# Patient Record
Sex: FEMALE | Race: WHITE | Employment: PART TIME | URBAN - METROPOLITAN AREA
[De-identification: names, ages, dates, MRNs, and addresses within clinical notes are randomized per-mention and may not be internally consistent; named-entity substitution may affect disease eponyms.]

---

## 2017-01-23 ENCOUNTER — ED HISTORICAL/CONVERTED ENCOUNTER (OUTPATIENT)
Dept: OTHER | Age: 42
End: 2017-01-23

## 2018-06-13 ENCOUNTER — ED HISTORICAL/CONVERTED ENCOUNTER (OUTPATIENT)
Dept: OTHER | Age: 43
End: 2018-06-13

## 2018-09-08 ENCOUNTER — IP HISTORICAL/CONVERTED ENCOUNTER (OUTPATIENT)
Dept: OTHER | Age: 43
End: 2018-09-08

## 2018-12-03 ENCOUNTER — ED HISTORICAL/CONVERTED ENCOUNTER (OUTPATIENT)
Dept: OTHER | Age: 43
End: 2018-12-03

## 2019-05-11 ENCOUNTER — ED HISTORICAL/CONVERTED ENCOUNTER (OUTPATIENT)
Dept: OTHER | Age: 44
End: 2019-05-11

## 2019-06-20 ENCOUNTER — HOSPITAL ENCOUNTER (OUTPATIENT)
Dept: MRI IMAGING | Age: 44
Discharge: HOME | End: 2019-06-20
Attending: ORTHOPAEDIC SURGERY

## 2019-06-20 DIAGNOSIS — M54.12 BRACHIAL NEURITIS: ICD-10-CM

## 2019-08-15 ENCOUNTER — OP HISTORICAL/CONVERTED ENCOUNTER (OUTPATIENT)
Dept: OTHER | Age: 44
End: 2019-08-15

## 2019-09-18 ENCOUNTER — OP HISTORICAL/CONVERTED ENCOUNTER (OUTPATIENT)
Dept: OTHER | Age: 44
End: 2019-09-18

## 2020-02-25 ENCOUNTER — ED HISTORICAL/CONVERTED ENCOUNTER (OUTPATIENT)
Dept: OTHER | Age: 45
End: 2020-02-25

## 2020-09-10 RX ORDER — METFORMIN HYDROCHLORIDE 500 MG/1
TABLET ORAL
Qty: 90 TAB | Refills: 4 | Status: SHIPPED | OUTPATIENT
Start: 2020-09-10 | End: 2021-04-19 | Stop reason: ALTCHOICE

## 2020-09-16 VITALS
HEIGHT: 66 IN | BODY MASS INDEX: 32.27 KG/M2 | HEART RATE: 76 BPM | WEIGHT: 200.8 LBS | SYSTOLIC BLOOD PRESSURE: 123 MMHG | TEMPERATURE: 97.5 F | DIASTOLIC BLOOD PRESSURE: 86 MMHG | OXYGEN SATURATION: 98 %

## 2020-09-16 PROBLEM — T78.40XA ALLERGY: Status: ACTIVE | Noted: 2020-09-16

## 2020-09-16 PROBLEM — I10 ESSENTIAL HYPERTENSION: Status: ACTIVE | Noted: 2020-09-16

## 2020-09-16 PROBLEM — E04.1 THYROID NODULE: Status: ACTIVE | Noted: 2020-09-16

## 2020-09-16 PROBLEM — E61.1 IRON DEFICIENCY: Status: ACTIVE | Noted: 2020-09-16

## 2020-09-16 PROBLEM — M19.90 ARTHRITIS: Status: ACTIVE | Noted: 2020-09-16

## 2020-09-16 PROBLEM — E04.2 MULTINODULAR GOITER: Status: ACTIVE | Noted: 2020-09-16

## 2020-09-16 PROBLEM — E78.00 HYPERCHOLESTEROLEMIA: Status: ACTIVE | Noted: 2020-09-16

## 2020-09-16 PROBLEM — E78.2 MIXED HYPERLIPIDEMIA: Status: ACTIVE | Noted: 2020-09-16

## 2020-09-16 RX ORDER — GLIPIZIDE 5 MG/1
TABLET ORAL 2 TIMES DAILY
COMMUNITY
End: 2021-02-09

## 2020-09-16 RX ORDER — HYDROCODONE BITARTRATE AND ACETAMINOPHEN 5; 325 MG/1; MG/1
TABLET ORAL
COMMUNITY
End: 2021-04-19 | Stop reason: ALTCHOICE

## 2020-09-16 RX ORDER — FLUCONAZOLE 150 MG/1
150 TABLET ORAL DAILY
COMMUNITY
End: 2021-04-19 | Stop reason: ALTCHOICE

## 2020-09-16 RX ORDER — PROMETHAZINE HYDROCHLORIDE 6.25 MG/5ML
6.25 SYRUP ORAL
COMMUNITY

## 2020-09-16 RX ORDER — POLYETHYLENE GLYCOL 3350, SODIUM CHLORIDE, SODIUM BICARBONATE, POTASSIUM CHLORIDE 420; 11.2; 5.72; 1.48 G/4L; G/4L; G/4L; G/4L
4000 POWDER, FOR SOLUTION ORAL
COMMUNITY

## 2020-09-16 RX ORDER — LACTULOSE 10 G/15ML
SOLUTION ORAL; RECTAL 3 TIMES DAILY
COMMUNITY

## 2020-09-16 RX ORDER — AMOXICILLIN AND CLAVULANATE POTASSIUM 875; 125 MG/1; MG/1
TABLET, FILM COATED ORAL EVERY 12 HOURS
COMMUNITY
End: 2021-04-19 | Stop reason: ALTCHOICE

## 2020-09-16 RX ORDER — GABAPENTIN 300 MG/1
300 CAPSULE ORAL 3 TIMES DAILY
COMMUNITY

## 2020-09-16 RX ORDER — DICLOFENAC SODIUM 75 MG/1
TABLET, DELAYED RELEASE ORAL
COMMUNITY
End: 2021-04-19 | Stop reason: ALTCHOICE

## 2020-09-16 RX ORDER — AZITHROMYCIN 250 MG/1
250 TABLET, FILM COATED ORAL DAILY
COMMUNITY
End: 2021-04-19 | Stop reason: ALTCHOICE

## 2020-09-16 RX ORDER — ACYCLOVIR 800 MG/1
800 TABLET ORAL 2 TIMES DAILY
COMMUNITY
End: 2021-04-19 | Stop reason: ALTCHOICE

## 2020-09-16 RX ORDER — ALBUTEROL SULFATE 90 UG/1
AEROSOL, METERED RESPIRATORY (INHALATION)
COMMUNITY

## 2020-09-16 RX ORDER — DICYCLOMINE HYDROCHLORIDE 10 MG/1
10 CAPSULE ORAL
COMMUNITY

## 2020-09-16 RX ORDER — METOPROLOL SUCCINATE 25 MG/1
TABLET, EXTENDED RELEASE ORAL DAILY
COMMUNITY

## 2020-09-16 RX ORDER — LIDOCAINE HYDROCHLORIDE 20 MG/ML
15 SOLUTION OROPHARYNGEAL AS NEEDED
COMMUNITY

## 2020-09-16 RX ORDER — CIPROFLOXACIN 500 MG/1
TABLET ORAL 2 TIMES DAILY
COMMUNITY
End: 2021-04-19 | Stop reason: ALTCHOICE

## 2020-09-16 RX ORDER — TRAMADOL HYDROCHLORIDE 50 MG/1
50 TABLET ORAL
COMMUNITY

## 2020-09-16 RX ORDER — AMOXICILLIN 500 MG/1
TABLET, FILM COATED ORAL
COMMUNITY
End: 2021-04-19 | Stop reason: ALTCHOICE

## 2020-09-16 RX ORDER — CEPHALEXIN 500 MG/1
500 CAPSULE ORAL 4 TIMES DAILY
COMMUNITY
End: 2021-04-19 | Stop reason: ALTCHOICE

## 2020-09-16 RX ORDER — SULFAMETHOXAZOLE AND TRIMETHOPRIM 800; 160 MG/1; MG/1
1 TABLET ORAL 2 TIMES DAILY
COMMUNITY
End: 2021-04-19 | Stop reason: ALTCHOICE

## 2020-09-16 RX ORDER — OMEPRAZOLE 40 MG/1
40 CAPSULE, DELAYED RELEASE ORAL DAILY
COMMUNITY
End: 2021-04-19 | Stop reason: ALTCHOICE

## 2020-09-16 RX ORDER — SIMVASTATIN 20 MG/1
TABLET, FILM COATED ORAL
COMMUNITY

## 2020-09-16 RX ORDER — PREDNISONE 20 MG/1
TABLET ORAL
COMMUNITY
End: 2021-04-19 | Stop reason: ALTCHOICE

## 2020-09-16 RX ORDER — LISINOPRIL 5 MG/1
TABLET ORAL DAILY
COMMUNITY

## 2020-09-16 RX ORDER — LISINOPRIL AND HYDROCHLOROTHIAZIDE 12.5; 2 MG/1; MG/1
TABLET ORAL DAILY
COMMUNITY

## 2020-09-16 RX ORDER — AMLODIPINE BESYLATE 5 MG/1
5 TABLET ORAL DAILY
COMMUNITY

## 2020-09-16 RX ORDER — OXYCODONE AND ACETAMINOPHEN 5; 325 MG/1; MG/1
TABLET ORAL
COMMUNITY
End: 2021-04-19 | Stop reason: ALTCHOICE

## 2020-09-16 RX ORDER — LORAZEPAM 1 MG/1
TABLET ORAL
COMMUNITY

## 2020-10-05 ENCOUNTER — TELEPHONE (OUTPATIENT)
Dept: ENDOCRINOLOGY | Age: 45
End: 2020-10-05

## 2020-10-05 DIAGNOSIS — E11.65 TYPE 2 DIABETES MELLITUS WITH HYPERGLYCEMIA, WITHOUT LONG-TERM CURRENT USE OF INSULIN (HCC): Primary | ICD-10-CM

## 2020-10-05 NOTE — TELEPHONE ENCOUNTER
Patient is calling explaining since you took her off of some of her diabetic medications her sugar level has gotten worse. She is trying to get it to go down and it will only if she doubles up on her metformin. Can you please give her a call? Also informed patient to contact her PCP.  Her appt with you is not until November 3 @ 4:15PM.

## 2020-10-06 NOTE — TELEPHONE ENCOUNTER
Please get patient's glucose readings for the past 7 days and send it to me. Also please inform patient that she should not be taking > 4 tabs of metformin 500 mg (total 2000 mg per day) as that is the max dose.

## 2020-10-08 RX ORDER — METFORMIN HYDROCHLORIDE 500 MG/1
TABLET ORAL
Qty: 120 TAB | Refills: 3 | Status: SHIPPED | OUTPATIENT
Start: 2020-10-08 | End: 2021-03-29

## 2020-10-08 RX ORDER — GLIPIZIDE 5 MG/1
5 TABLET ORAL 2 TIMES DAILY
Qty: 60 TAB | Refills: 3 | Status: SHIPPED | OUTPATIENT
Start: 2020-10-08 | End: 2020-11-07

## 2020-10-08 NOTE — TELEPHONE ENCOUNTER
Reviewed glucose log. We need to increase metformin to 500 mg 2 tabs twice a day everyday and I am going to restart glipizide 5 mg twice a day before meals. Need to work on diabetic diet: avoid sugary beverages, fruit juices (even sugar free), cut down on bread, rice, pasta. Check glucose once a day everyday, at different times of day and bring log to next visit. I am sending refill on meds.

## 2020-10-08 NOTE — TELEPHONE ENCOUNTER
Patient said that she has taken 2 in the evening and if she feel like she need it then it will be 4 tabs total for the day. She said that she has an appt with Dr. Erickson Pass Monday morning to get something to go with her Metformin. I will be uploading her log soon.

## 2021-03-11 ENCOUNTER — TELEPHONE (OUTPATIENT)
Dept: ENDOCRINOLOGY | Age: 46
End: 2021-03-11

## 2021-03-11 RX ORDER — GLIPIZIDE 5 MG/1
TABLET ORAL
Qty: 60 TAB | Refills: 0 | Status: SHIPPED | OUTPATIENT
Start: 2021-03-11 | End: 2021-07-19 | Stop reason: SDUPTHER

## 2021-03-11 NOTE — TELEPHONE ENCOUNTER
Patient is overdue for a follow-up appointment. I sent 1 month refill on her medication. She will not be given further refills without first seeing her in the office. Please make appointment next available 15 minutes for diabetes.

## 2021-03-29 ENCOUNTER — TELEPHONE (OUTPATIENT)
Dept: ENDOCRINOLOGY | Age: 46
End: 2021-03-29

## 2021-04-15 RX ORDER — BLOOD SUGAR DIAGNOSTIC
STRIP MISCELLANEOUS
Qty: 100 STRIP | Refills: 1 | Status: SHIPPED | OUTPATIENT
Start: 2021-04-15

## 2021-04-19 ENCOUNTER — OFFICE VISIT (OUTPATIENT)
Dept: ENDOCRINOLOGY | Age: 46
End: 2021-04-19
Payer: COMMERCIAL

## 2021-04-19 VITALS
HEIGHT: 66 IN | HEART RATE: 76 BPM | WEIGHT: 201.9 LBS | OXYGEN SATURATION: 98 % | TEMPERATURE: 97.9 F | SYSTOLIC BLOOD PRESSURE: 115 MMHG | DIASTOLIC BLOOD PRESSURE: 97 MMHG | BODY MASS INDEX: 32.45 KG/M2

## 2021-04-19 DIAGNOSIS — E04.2 MULTINODULAR GOITER: ICD-10-CM

## 2021-04-19 DIAGNOSIS — E11.65 TYPE 2 DIABETES MELLITUS WITH HYPERGLYCEMIA, WITHOUT LONG-TERM CURRENT USE OF INSULIN (HCC): Primary | ICD-10-CM

## 2021-04-19 LAB
GLUCOSE POC: 244 MG/DL
HBA1C MFR BLD HPLC: 8.3 %

## 2021-04-19 PROCEDURE — 3052F HG A1C>EQUAL 8.0%<EQUAL 9.0%: CPT | Performed by: INTERNAL MEDICINE

## 2021-04-19 PROCEDURE — 82962 GLUCOSE BLOOD TEST: CPT | Performed by: INTERNAL MEDICINE

## 2021-04-19 PROCEDURE — 83036 HEMOGLOBIN GLYCOSYLATED A1C: CPT | Performed by: INTERNAL MEDICINE

## 2021-04-19 PROCEDURE — 99214 OFFICE O/P EST MOD 30 MIN: CPT | Performed by: INTERNAL MEDICINE

## 2021-04-19 RX ORDER — GLIPIZIDE 5 MG/1
5 TABLET ORAL 2 TIMES DAILY
Qty: 60 TAB | Refills: 3 | Status: SHIPPED | OUTPATIENT
Start: 2021-04-19 | End: 2021-05-19

## 2021-04-19 RX ORDER — METFORMIN HYDROCHLORIDE 500 MG/1
TABLET, EXTENDED RELEASE ORAL
Qty: 120 TAB | Refills: 3 | Status: SHIPPED | OUTPATIENT
Start: 2021-04-19 | End: 2021-07-19 | Stop reason: SDUPTHER

## 2021-04-19 NOTE — LETTER
4/20/2021 Patient: Renea Aguilera YOB: 1975 Date of Visit: 4/19/2021 Adina Heredia MD 
[address] [address] [address] Fax: [phone] Dear Adina Heredia MD, Thank you for referring Ms. Michelle Smith to [address] [place] [address] ENDOCRINOLOGY for evaluation. My notes for this consultation are attached. If you have questions, please do not hesitate to call me. I look forward to following your patient along with you. Sincerely, Nga Mcdaniels MD

## 2021-04-19 NOTE — PROGRESS NOTES
History and Physical    Patient: Omi King MRN: [id number]  SSN: [id number]    YOB: 1975  Age: 50 y.o. Sex: female      Subjective:      Omi King is a 50 y.o. female  with past medical history of hypertension, hyperlipidemia is here for type 2 diabetes mellitus and thyroid nodules. She is in primary care of Dr. Gokul Ferguson. I am seeing this patient after 14 months. Patient had Bell's palsy last year. Other than that no ER visits or hospitalizations. She is currently taking metformin 500 mg 2 tablets twice a day, glipizide 5 mg twice a day. However, she ran out of glipizide and she needs a refill. She has been having diarrhea, wondering if it is coming from Metformin. Not checking blood glucose at home regularly. Does not follow diabetic diet. She had diabetic eye exam within the past year. She was told there is no retinopathy. Continues to drink soda. She works as a  at [employer], physically active at work but does not exercise after that.  She is due for diabetic eye exam.    Glucometer reading: Does not check blood glucose at home    Updated diabetes history:  · Diagnosis: 10-15 years    · Current treatment: metformin 500 mg half tab twice a day, Glipizide 5 mg half tab twice a day    · Past treatment: none    · Glucose checks: not checking    · Hyperglycemia: not sure    · Hypoglycemia: no    · Meals per day: 3, breakfast: eggs or sausage or keenan, lunch: snack: fruit, chips, dinner: meat and vegetable and starch or salad or hot pocket, snacks: sometimes regular soda 1 a day    · Exercise: not other than work    · DM related hospitalizations: no        Complications of DM:    · CAD: no    · CVA: no    · PVD: no    · Amputations: no     · Retinopathy: no; last exam was long time back    · Gastropathy: no    · Nephropathy: no    · Neuropathy: yes        Medications:    · Statin: simvastatin 40    · ACE-I: no, hyperkalemia    · ASA: no        · Diabetes education: no    Thyroid nodule:  After the initial visit patient had thyroid ultrasound. After looking at ultrasound it was decided that right lobe 1.8 and 2 cm nodules and left lobe 1.5 cm nodules meet criteria for biopsy. Patient had biopsy on 2019 and it was resulted as benign. Plan was to repeat thyroid ultrasound last year, but she did not get it done. She denies any new symptoms. initial history:  Thyroid nodule noted incidentally during imaging: CT chest without contrast on [id number], which was done to evaluate left-sided chest pain which started after patient had a fall in the bathroom  Difficulty in swallowing (food/pills getting stuck): yes since cervical fusion, have some difficulty in swallowing saliva  Difficulty in breathing: no  Visible swelling in the neck: no  Hoarseness of voice: yes since last year  Family history of thyroid cancer: no  Personal history of radiation exposure to head/neck region: no  family/personal history of other cancers: maternal GF had some type of cancer, uncle had some type of cancer  Symptoms of hypo/hyperthyroidism: tired all the time, hot flashes. Past Medical History:   Diagnosis Date    Allergy 2020    Arthritis 2020    Essential hypertension 2020    Hypercholesterolemia 2020    Iron deficiency 2020    Mixed hyperlipidemia 2020    Multinodular goiter 2020    Thyroid nodule 2020    Uncontrolled type 2 diabetes mellitus ([address] 2020     Past Surgical History:   Procedure Laterality Date    HX  SECTION        Family History   Problem Relation Age of Onset    Diabetes Mother     Hypertension Father     Diabetes Brother      Social History     Tobacco Use    Smoking status: Never Smoker    Smokeless tobacco: Never Used   Substance Use Topics    Alcohol use: Not Currently      Prior to Admission medications    Medication Sig Start Date End Date Taking?  Authorizing Provider   glipiZIDE (GLUCOTROL) 5 mg tablet Take 1 Tab by mouth two (2) times a day for 30 days. 4/19/21 5/19/21 Yes Tamara Street MD   metFORMIN ER (GLUCOPHAGE XR) 500 mg tablet Take 2 tabs twice a day 4/19/21  Yes Tamara Street MD   OneTouch Verio test strips strip USE TO TEST ONCE DAILY AS DIRECTED 4/15/21  Yes Tamara Street MD   glipiZIDE (GLUCOTROL) 5 mg tablet TAKE ONE TABLET BY MOUTH TWICE DAILY. TAKE 20 MINUTES BEFORE MEALS. DO NOT TAKE IF SKIPPING MEAL 3/11/21  Yes Tamara Street MD   gabapentin (NEURONTIN) 300 mg capsule Take 300 mg by mouth three (3) times daily. Yes Provider, Historical   lidocaine (Lidocaine Viscous) 2 % solution Take 15 mL by mouth as needed for Pain. Yes Provider, Historical   metoprolol succinate (TOPROL-XL) 25 mg XL tablet Take  by mouth daily. Yes Provider, Historical   lancing device/lancets (ONETOUCH DELICA PLUS Gochikuru DEV) by Does Not Apply route. Yes Provider, Historical   albuterol (ProAir HFA) 90 mcg/actuation inhaler Take  by inhalation. Yes Provider, Historical   simvastatin (ZOCOR) 20 mg tablet Take  by mouth nightly. Yes Provider, Historical   amLODIPine (NORVASC) 5 mg tablet Take 5 mg by mouth daily. Yes Provider, Historical   dicyclomine (BENTYL) 10 mg capsule Take 10 mg by mouth 4 times daily (before meals and nightly). Yes Provider, Historical   peg-electrolyte soln (GaviLyte-N) 420 gram solution Take 4,000 mL by mouth now. Yes Provider, Historical   lactulose (CHRONULAC) 10 gram/15 mL solution Take  by mouth three (3) times daily. Yes Provider, Historical   lisinopril-hydroCHLOROthiazide (PRINZIDE, ZESTORETIC) 20-12.5 mg per tablet Take  by mouth daily. Yes Provider, Historical   lisinopriL (PRINIVIL, ZESTRIL) 5 mg tablet Take  by mouth daily. Yes Provider, Historical   LORazepam (ATIVAN) 1 mg tablet Take  by mouth every four (4) hours as needed for Anxiety.    Yes Provider, Historical   promethazine (PHENERGAN) 6.25 mg/5 mL syrup Take 6.25 mg by mouth four (4) times daily as needed for Nausea. Yes Provider, Historical   traMADoL (ULTRAM) 50 mg tablet Take 50 mg by mouth every six (6) hours as needed for Pain. Yes Provider, Historical        Allergies   Allergen Reactions    Bee Venom Protein (Honey Bee) Swelling       Review of Systems:  ROS    A comprehensive review of systems was preformed and it is negative except mentioned in HPI    Objective:     Vitals:    04/19/21 1645 04/19/21 1655   BP: (!) 143/91 (!) 115/97   Pulse: 81 76   Temp: 97.9 °F (36.6 °C)    TempSrc: Temporal    SpO2: 98%    Weight: 201 lb 14.4 oz (91.6 kg)    Height: 5' 6\" (1.676 m)         Physical Exam:    Physical Exam  Vitals signs and nursing note reviewed. Constitutional:       Appearance: Normal appearance. HENT:      Head: Normocephalic and atraumatic. Cardiovascular:      Rate and Rhythm: Normal rate and regular rhythm. Pulmonary:      Effort: Pulmonary effort is normal.      Breath sounds: Normal breath sounds. Neurological:      Mental Status: She is alert. Labs and Imaging:    Last 3 Recorded Weights in this Encounter    04/19/21 1645   Weight: 201 lb 14.4 oz (91.6 kg)        No results found for: HBA1C, HGBE8, GEI7OFTE, MFI7PFOC, ZBG1ZSHN     Assessment:     Patient Active Problem List   Diagnosis Code    Arthritis M19.90    Essential hypertension I10    Hypercholesterolemia E78.00    Iron deficiency E61.1    Mixed hyperlipidemia E78.2    Multinodular goiter E04.2    Allergy T78.40XA    Thyroid nodule E04.1    Uncontrolled type 2 diabetes mellitus ([address] E11.65    Type 2 diabetes mellitus with hyperglycemia, without long-term current use of insulin ([address] E11.65           Plan:     type II diabetes mellitus uncontrolled  Hemoglobin A1c was explained 6.8% in May 2019, 8.5% on 2-, 8.3% today. Fingerstick blood glucose is 244 mg/dL in my office today. Up to date with diabetes related annual labs: No    Up to date with diabetic eye exam: Yes    Plan:   I had a detailed discussion with patient about importance of controlling diabetes, diabetic diet, decreasing portion size of starches, increasing vegetable intake. Switch from regular Metformin to Metformin  mg 2 tablets twice a day. Refill glipizide 5 mg twice a day. Advised patient to start checking blood glucose once a day every day and bring glucometer to next visit in 3 months. Labs prior to next visit. thyroid nodule  Normal TSH and free T4 on 8[id number]. Normal calcium in May 2019. CT chest images from 8110268. Bilateral thyroid lobes appear to be enlarged and have multiple hypoechoic nodules. US thyroid:  right lobe 1.8 cm and 2 cm nodule's meet criteria for biopsy because of size. They are isoechoic in appearance. No suspicious features. Left lower 1.5 cm nodule is hypoechoic. This meets criteria for biopsy as well. Left nodule and right nodule (not sure which one) were biopsied on 9- and resulted as benign. Normal TSH on 10-. Plan:  Repeat US     mixed hyperlipidemia  currently on simvastatin 20 mg daily. LDL was 99 in May 2019. Continue the same for now. I will consider switching this to high intensity statin in future. Check lipid profile prior to next visit.    essential hypertension  blood pressure well controlled on current medications. Check urine microalbumin/creatinine ratio prior to next visit. Orders Placed This Encounter    US THYROID/PARATHYROID/SOFT TISS     Standing Status:   Future     Standing Expiration Date:   5/19/2022     Order Specific Question:   Is Patient Pregnant?      Answer:   Unknown     Order Specific Question:   Reason for Exam     Answer:   multinodular goiter follow up    LIPID PANEL    METABOLIC PANEL, COMPREHENSIVE    MICROALBUMIN, UR, RAND W/ MICROALB/CREAT RATIO    AMB POC HEMOGLOBIN A1C    AMB POC GLUCOSE BLOOD, BY GLUCOSE MONITORING DEVICE    glipiZIDE (GLUCOTROL) 5 mg tablet     Sig: Take 1 Tab by mouth two (2) times a day for 30 days.      Dispense:  60 Tab     Refill:  3    metFORMIN ER (GLUCOPHAGE XR) 500 mg tablet     Sig: Take 2 tabs twice a day     Dispense:  120 Tab     Refill:  3        Signed By: Lissette Georges MD     April 20, 2021      Return to clinic 3 months

## 2021-04-20 PROBLEM — E11.65 TYPE 2 DIABETES MELLITUS WITH HYPERGLYCEMIA, WITHOUT LONG-TERM CURRENT USE OF INSULIN (HCC): Status: ACTIVE | Noted: 2021-04-20

## 2021-04-22 ENCOUNTER — HOSPITAL ENCOUNTER (OUTPATIENT)
Dept: ULTRASOUND IMAGING | Age: 46
Discharge: HOME | End: 2021-04-22
Attending: INTERNAL MEDICINE
Payer: COMMERCIAL

## 2021-04-22 DIAGNOSIS — E04.2 MULTINODULAR GOITER: ICD-10-CM

## 2021-04-22 PROCEDURE — 76536 US EXAM OF HEAD AND NECK: CPT

## 2021-05-31 ENCOUNTER — APPOINTMENT (OUTPATIENT)
Dept: GENERAL RADIOLOGY | Age: 46
End: 2021-05-31
Attending: EMERGENCY MEDICINE
Payer: COMMERCIAL

## 2021-05-31 ENCOUNTER — HOSPITAL ENCOUNTER (EMERGENCY)
Age: 46
Discharge: HOME | End: 2021-05-31
Attending: EMERGENCY MEDICINE
Payer: COMMERCIAL

## 2021-05-31 VITALS
HEART RATE: 83 BPM | OXYGEN SATURATION: 99 % | RESPIRATION RATE: 20 BRPM | WEIGHT: 180 LBS | TEMPERATURE: 98.5 F | SYSTOLIC BLOOD PRESSURE: 175 MMHG | HEIGHT: 66 IN | BODY MASS INDEX: 28.93 KG/M2 | DIASTOLIC BLOOD PRESSURE: 95 MMHG

## 2021-05-31 DIAGNOSIS — Z91.14 NON COMPLIANCE W MEDICATION REGIMEN: ICD-10-CM

## 2021-05-31 DIAGNOSIS — M25.562 ARTHRALGIA OF LEFT LOWER LEG: ICD-10-CM

## 2021-05-31 DIAGNOSIS — M25.472 ANKLE SWELLING, LEFT: ICD-10-CM

## 2021-05-31 DIAGNOSIS — M25.572 PAIN IN JOINT INVOLVING ANKLE AND FOOT, LEFT: Primary | ICD-10-CM

## 2021-05-31 DIAGNOSIS — I10 ELEVATED BLOOD PRESSURE READING WITH DIAGNOSIS OF HYPERTENSION: ICD-10-CM

## 2021-05-31 PROCEDURE — 74011250637 HC RX REV CODE- 250/637: Performed by: EMERGENCY MEDICINE

## 2021-05-31 PROCEDURE — 73630 X-RAY EXAM OF FOOT: CPT

## 2021-05-31 PROCEDURE — 99282 EMERGENCY DEPT VISIT SF MDM: CPT

## 2021-05-31 PROCEDURE — 73610 X-RAY EXAM OF ANKLE: CPT

## 2021-05-31 RX ORDER — IBUPROFEN 600 MG/1
600 TABLET ORAL
Qty: 20 TABLET | Refills: 0 | Status: SHIPPED | OUTPATIENT
Start: 2021-05-31

## 2021-05-31 RX ORDER — IBUPROFEN 600 MG/1
600 TABLET ORAL
Status: COMPLETED | OUTPATIENT
Start: 2021-05-31 | End: 2021-05-31

## 2021-05-31 RX ADMIN — IBUPROFEN 600 MG: 600 TABLET ORAL at 13:03

## 2021-06-01 ENCOUNTER — TRANSCRIBE ORDER (OUTPATIENT)
Dept: REGISTRATION | Age: 46
End: 2021-06-01

## 2021-06-01 ENCOUNTER — HOSPITAL ENCOUNTER (OUTPATIENT)
Dept: NON INVASIVE DIAGNOSTICS | Age: 46
Discharge: HOME | End: 2021-06-01
Payer: COMMERCIAL

## 2021-06-01 DIAGNOSIS — M79.609 LIMB PAIN: Primary | ICD-10-CM

## 2021-06-01 DIAGNOSIS — M79.609 PAIN IN EXTREMITY, UNSPECIFIED EXTREMITY: ICD-10-CM

## 2021-06-01 DIAGNOSIS — M79.609 LIMB PAIN: ICD-10-CM

## 2021-06-01 PROCEDURE — 93971 EXTREMITY STUDY: CPT | Performed by: SURGERY

## 2021-06-01 PROCEDURE — 93971 EXTREMITY STUDY: CPT

## 2021-06-21 NOTE — ED PROVIDER NOTES
EMERGENCY DEPARTMENT HISTORY AND PHYSICAL EXAM      Date: 5/31/2021  Patient Name: Kellie Colon    History of Presenting Illness     Chief Complaint   Patient presents with    Foot Pain       History Provided By: Patient    HPI: Kellie Colon, 50 y.o. female with a past medical history significant No significant past medical history presents to the ED with cc of left foot and ankle pain. Pt states she can walk but has had increased discomfort. She has had minimal swelling. She has never injured before. She denies redness, fever, CP, SOB. There are no other complaints, changes, or physical findings at this time. PCP: Deven Melo MD    No current facility-administered medications on file prior to encounter. Current Outpatient Medications on File Prior to Encounter   Medication Sig Dispense Refill    metFORMIN ER (GLUCOPHAGE XR) 500 mg tablet Take 2 tabs twice a day 120 Tab 3    OneTouch Verio test strips strip USE TO TEST ONCE DAILY AS DIRECTED 100 Strip 1    glipiZIDE (GLUCOTROL) 5 mg tablet TAKE ONE TABLET BY MOUTH TWICE DAILY. TAKE 20 MINUTES BEFORE MEALS. DO NOT TAKE IF SKIPPING MEAL 60 Tab 0    gabapentin (NEURONTIN) 300 mg capsule Take 300 mg by mouth three (3) times daily.  lidocaine (Lidocaine Viscous) 2 % solution Take 15 mL by mouth as needed for Pain.  metoprolol succinate (TOPROL-XL) 25 mg XL tablet Take  by mouth daily.  lancing device/lancets (ONETOUCH DELICA PLUS [place] DEV) by Does Not Apply route.  albuterol (ProAir HFA) 90 mcg/actuation inhaler Take  by inhalation.  simvastatin (ZOCOR) 20 mg tablet Take  by mouth nightly.  amLODIPine (NORVASC) 5 mg tablet Take 5 mg by mouth daily.  dicyclomine (BENTYL) 10 mg capsule Take 10 mg by mouth 4 times daily (before meals and nightly).  peg-electrolyte soln (GaviLyte-N) 420 gram solution Take 4,000 mL by mouth now.       lactulose (CHRONULAC) 10 gram/15 mL solution Take  by mouth three (3) times daily.  lisinopril-hydroCHLOROthiazide (PRINZIDE, ZESTORETIC) 20-12.5 mg per tablet Take  by mouth daily.  lisinopriL (PRINIVIL, ZESTRIL) 5 mg tablet Take  by mouth daily.  LORazepam (ATIVAN) 1 mg tablet Take  by mouth every four (4) hours as needed for Anxiety.  promethazine (PHENERGAN) 6.25 mg/5 mL syrup Take 6.25 mg by mouth four (4) times daily as needed for Nausea.  traMADoL (ULTRAM) 50 mg tablet Take 50 mg by mouth every six (6) hours as needed for Pain. Past History     Past Medical History:  Past Medical History:   Diagnosis Date    Allergy 2020    Arthritis 2020    Essential hypertension 2020    Hypercholesterolemia 2020    Iron deficiency 2020    Mixed hyperlipidemia 2020    Multinodular goiter 2020    Thyroid nodule 2020    Uncontrolled type 2 diabetes mellitus ([facility] [address] 2020       Past Surgical History:  Past Surgical History:   Procedure Laterality Date    HX  SECTION         Family History:  Family History   Problem Relation Age of Onset    Diabetes Mother     Hypertension Father     Diabetes Brother        Social History:  Social History     Tobacco Use    Smoking status: Never Smoker    Smokeless tobacco: Never Used   Vaping Use    Vaping Use: Never used   Substance Use Topics    Alcohol use: Not Currently    Drug use: Not Currently       Allergies: Allergies   Allergen Reactions    Bee Venom Protein (Honey Bee) Swelling         Review of Systems     Review of Systems   Constitutional: Negative for activity change, appetite change, fatigue and fever. HENT: Negative. Negative for congestion, rhinorrhea and sore throat. Respiratory: Negative. Negative for cough, shortness of breath and wheezing. Cardiovascular: Negative. Negative for chest pain and leg swelling. Gastrointestinal: Negative.   Negative for abdominal distention, abdominal pain, constipation, diarrhea, nausea and vomiting. Endocrine: Negative. Genitourinary: Negative for difficulty urinating, dysuria, menstrual problem, vaginal bleeding and vaginal discharge. Musculoskeletal: Positive for arthralgias and myalgias. Negative for joint swelling. Skin: Negative. Negative for rash. Neurological: Negative. Negative for dizziness, weakness, light-headedness and headaches. Psychiatric/Behavioral: Negative. Physical Exam     Physical Exam  Vitals and nursing note reviewed. Constitutional:       General: She is not in acute distress. Appearance: Normal appearance. She is not ill-appearing or toxic-appearing. HENT:      Head: Normocephalic. Mouth/Throat:      Mouth: Mucous membranes are moist.   Eyes:      Extraocular Movements: Extraocular movements intact. Conjunctiva/sclera: Conjunctivae normal.      Pupils: Pupils are equal, round, and reactive to light. Cardiovascular:      Rate and Rhythm: Normal rate and regular rhythm. Pulses: Normal pulses. Pulmonary:      Effort: Pulmonary effort is normal.      Breath sounds: Normal breath sounds. Abdominal:      General: Abdomen is flat. Bowel sounds are normal.      Palpations: Abdomen is soft. Tenderness: There is no abdominal tenderness. There is no guarding. Musculoskeletal:         General: Tenderness present. No swelling or deformity. Normal range of motion. Cervical back: Normal range of motion. Comments: ttp over dorsal left foot and medial ankle  FROM  No redness  No joint effusion   Skin:     Findings: No erythema or rash. Neurological:      General: No focal deficit present. Mental Status: She is alert and oriented to person, place, and time. Cranial Nerves: No cranial nerve deficit. Psychiatric:         Mood and Affect: Mood normal.         Lab and Diagnostic Study Results     Labs -   No results found for this or any previous visit (from the past 12 hour(s)).     Radiologic Studies - @lastxrresult@  CT Results  (Last 48 hours)    None        CXR Results  (Last 48 hours)    None            Medical Decision Making   - I am the first provider for this patient. - I reviewed the vital signs, available nursing notes, past medical history, past surgical history, family history and social history. - Initial assessment performed. The patients presenting problems have been discussed, and they are in agreement with the care plan formulated and outlined with them. I have encouraged them to ask questions as they arise throughout their visit. Vital Signs-Reviewed the patient's vital signs. Vitals    Initial BP Temp Pulse (Heart Rate) Resp Rate O2 Sat (%)   05/31 1227 175/95Important  98.5 °F (36.9 °C) 83 20 99 %     Height and Weight    Weight Height BMI   81.6 kg (180 lb         Records Reviewed: Nursing Notes    The patient presents with foot pain with a differential diagnosis of gout, sprain, strain, fracture, contusion, arthritis without signs of septic joint      ED Course:          Provider Notes (Medical Decision Making):     MDM  Number of Diagnoses or Management Options     Will image foot and ankle. Treat symptomatically. Provide follow-up as indicated. Procedures   Medical Decision Makingedical Decision Making        XR FOOT LT MIN 3 V (Final result)  Result time 05/31/21 13:07:28  Final result by Js Malcolm MD (05/31/21 13:07:28)                Impression:    No acute fracture or dislocation. Narrative:    Studies:   Left ankle radiographs, 3 views   Left foot radiographs, 3 views     Clinical Indication: Pain. Comparison: None available. Findings:     Left ankle:   Anatomic alignment. No evidence of acute fracture or dislocation. Joint spaces   are preserved. Overall bone mineralization is within normal limits. No joint   effusion. Left foot:   Anatomic alignment. No evidence of acute fracture or dislocation. Joint spaces   are preserved. Overall bone mineralization is within normal limits. No   radiopaque foreign body.                       XR ANKLE LT      PM  Discussed with patient at length the need for blood pressure re-evaluation and management with primary care. Discussed the long term sequelae for elevated blood pressure including blindness, CVA, MI, and renal failure/ dialysis. Pt agrees to follow up as directed. Niles Dowd MD         Disposition   Disposition: DC- Adult Discharges: All of the diagnostic tests were reviewed and questions answered. Diagnosis, care plan and treatment options were discussed. The patient understands the instructions and will follow up as directed. The patients results have been reviewed with them. They have been counseled regarding their diagnosis. The patient verbally convey understanding and agreement of the signs, symptoms, diagnosis, treatment and prognosis and additionally agrees to follow up as recommended with their PCP in 24 - 48 hours. They also agree with the care-plan and convey that all of their questions have been answered. I have also put together some discharge instructions for them that include: 1) educational information regarding their diagnosis, 2) how to care for their diagnosis at home, as well a 3) list of reasons why they would want to return to the ED prior to their follow-up appointment, should their condition change. Discharged    DISCHARGE PLAN:  1. Cannot display discharge medications since this patient is not currently admitted.     2.   Follow-up Information     Follow up With Specialties Details Why Contact Info    Josee Will MD Internal Medicine In 2 days Sure to follow-up with your PCP regarding your blood pressure medication and blood pressure recheck, and pain in your left leg [address] [facility]  [phone]      [address] [place] [address] Emergency Medicine  As needed, If symptoms worsen [address] [place] [id number]  [address]  In 3 days  [phone]        3. Return to ED if worse   4. Discharge Medication List as of 5/31/2021  1:47 PM      START taking these medications    Details   ibuprofen (MOTRIN) 600 mg tablet Take 1 Tablet by mouth every six (6) hours as needed for Pain., Normal, Disp-20 Tablet, R-0         CONTINUE these medications which have NOT CHANGED    Details   metFORMIN ER (GLUCOPHAGE XR) 500 mg tablet Take 2 tabs twice a day, Normal, Disp-120 Tab, R-3      OneTouch Verio test strips strip USE TO TEST ONCE DAILY AS DIRECTED, Normal, Disp-100 Strip, R-1      glipiZIDE (GLUCOTROL) 5 mg tablet TAKE ONE TABLET BY MOUTH TWICE DAILY. TAKE 20 MINUTES BEFORE MEALS. DO NOT TAKE IF SKIPPING MEAL, Normal, Disp-60 Tab, R-0      gabapentin (NEURONTIN) 300 mg capsule Take 300 mg by mouth three (3) times daily. , Historical Med      lidocaine (Lidocaine Viscous) 2 % solution Take 15 mL by mouth as needed for Pain., Historical Med      metoprolol succinate (TOPROL-XL) 25 mg XL tablet Take  by mouth daily. , Historical Med      lancing device/lancets (ONETOUCH DELICA PLUS [employer] DEV) by Does Not Apply route., Historical Med      albuterol (ProAir HFA) 90 mcg/actuation inhaler Take  by inhalation. , Historical Med      simvastatin (ZOCOR) 20 mg tablet Take  by mouth nightly., Historical Med      amLODIPine (NORVASC) 5 mg tablet Take 5 mg by mouth daily. , Historical Med      dicyclomine (BENTYL) 10 mg capsule Take 10 mg by mouth 4 times daily (before meals and nightly). , Historical Med      peg-electrolyte soln (GaviLyte-N) 420 gram solution Take 4,000 mL by mouth now., Historical Med      lactulose (CHRONULAC) 10 gram/15 mL solution Take  by mouth three (3) times daily. , Historical Med      lisinopril-hydroCHLOROthiazide (PRINZIDE, ZESTORETIC) 20-12.5 mg per tablet Take  by mouth daily. , Historical Med      lisinopriL (PRINIVIL, ZESTRIL) 5 mg tablet Take by mouth daily. , Historical Med      LORazepam (ATIVAN) 1 mg tablet Take  by mouth every four (4) hours as needed for Anxiety. , Historical Med      promethazine (PHENERGAN) 6.25 mg/5 mL syrup Take 6.25 mg by mouth four (4) times daily as needed for Nausea., Historical Med      traMADoL (ULTRAM) 50 mg tablet Take 50 mg by mouth every six (6) hours as needed for Pain., Historical Med               Diagnosis     Clinical Impression:   1. Pain in joint involving ankle and foot, left    2. Arthralgia of left lower leg    3. Ankle swelling, left    4. Elevated blood pressure reading with diagnosis of hypertension    5. Non compliance w medication regimen        Attestations:    Sarah Man MD    Please note that this dictation was completed with [employer], the [employer] voice recognition software. Quite often unanticipated grammatical, syntax, homophones, and other interpretive errors are inadvertently transcribed by the computer software. Please disregard these errors. Please excuse any errors that have escaped final proofreading. Thank you.

## 2021-07-19 ENCOUNTER — OFFICE VISIT (OUTPATIENT)
Dept: ENDOCRINOLOGY | Age: 46
End: 2021-07-19
Payer: COMMERCIAL

## 2021-07-19 ENCOUNTER — TELEPHONE (OUTPATIENT)
Dept: ENDOCRINOLOGY | Age: 46
End: 2021-07-19

## 2021-07-19 VITALS
OXYGEN SATURATION: 99 % | HEIGHT: 66 IN | HEART RATE: 71 BPM | WEIGHT: 203.6 LBS | DIASTOLIC BLOOD PRESSURE: 83 MMHG | SYSTOLIC BLOOD PRESSURE: 136 MMHG | TEMPERATURE: 97.4 F | BODY MASS INDEX: 32.72 KG/M2

## 2021-07-19 DIAGNOSIS — E11.65 TYPE 2 DIABETES MELLITUS WITH HYPERGLYCEMIA, WITHOUT LONG-TERM CURRENT USE OF INSULIN (HCC): Primary | ICD-10-CM

## 2021-07-19 DIAGNOSIS — E04.2 MULTINODULAR GOITER: ICD-10-CM

## 2021-07-19 LAB — GLUCOSE POC: 147 MG/DL

## 2021-07-19 PROCEDURE — 82962 GLUCOSE BLOOD TEST: CPT | Performed by: INTERNAL MEDICINE

## 2021-07-19 PROCEDURE — 3052F HG A1C>EQUAL 8.0%<EQUAL 9.0%: CPT | Performed by: INTERNAL MEDICINE

## 2021-07-19 PROCEDURE — 99214 OFFICE O/P EST MOD 30 MIN: CPT | Performed by: INTERNAL MEDICINE

## 2021-07-19 RX ORDER — GLIPIZIDE 5 MG/1
5 TABLET ORAL 2 TIMES DAILY
Qty: 60 TABLET | Refills: 3 | Status: SHIPPED | OUTPATIENT
Start: 2021-07-19 | End: 2021-08-18

## 2021-07-19 RX ORDER — OMEPRAZOLE 20 MG/1
CAPSULE, DELAYED RELEASE ORAL
COMMUNITY
Start: 2021-04-19

## 2021-07-19 RX ORDER — MELOXICAM 15 MG/1
TABLET ORAL
COMMUNITY
Start: 2021-06-23

## 2021-07-19 RX ORDER — METFORMIN HYDROCHLORIDE 500 MG/1
TABLET, EXTENDED RELEASE ORAL
Qty: 120 TABLET | Refills: 3 | Status: SHIPPED | OUTPATIENT
Start: 2021-07-19 | End: 2021-12-14

## 2021-07-19 NOTE — PROGRESS NOTES
History and Physical    Patient: Melvia Snellen MRN: [id number]  SSN: [id number]    YOB: 1975  Age: 50 y.o. Sex: female      Subjective:      Melvia Snellen is a 50 y.o. female  with past medical history of hypertension, hyperlipidemia is here for type 2 diabetes mellitus and thyroid nodules. She is in primary care of Dr. Zeus Luu. At the last visit patient was mentioning she has diarrhea from Metformin. So I switched her to Metformin  mg 2 tablets twice a day. She is doing much better with this and she is reporting compliance with this. Also taking glipizide 5 mg twice a day. She checks blood glucose once a day. Did not bring glucometer today. She tells me her readings run from low to mid 100s. Denies hypoglycemia. Today she is stressed because right ankle has been swollen. She was given a brace and prescribed meloxicam but she has not been taking it because she is worried about side effects. She is also going to start physical therapy soon. She has not made much changes in her eating habits. Continues to drink soda. She works as a  at [employer], physically active at work but does not exercise after that.      Glucometer reading: Did not bring glucometer today    Updated diabetes history:  · Diagnosis: 10-15 years    · Current treatment: metformin  mg 2 tablets twice a day, Glipizide 5 mg half tab twice a day    · Past treatment: Metformin (diarrhea)    · Glucose checks: not checking    · Hyperglycemia: not sure    · Hypoglycemia: no    · Meals per day: 3, breakfast: eggs or sausage or keenan, lunch: snack: fruit, chips, dinner: meat and vegetable and starch or salad or hot pocket, snacks: sometimes regular soda 1 a day    · Exercise: not other than work    · DM related hospitalizations: no        Complications of DM:    · CAD: no    · CVA: no    · PVD: no    · Amputations: no     · Retinopathy: no; last exam was long time back    · Gastropathy: no    · Nephropathy: no    · Neuropathy: yes        Medications:    · Statin: simvastatin 40    · ACE-I: no, hyperkalemia    · ASA: no        · Diabetes education: no    Thyroid nodule:  After the initial visit patient had thyroid ultrasound. After looking at ultrasound it was decided that right lobe 1.8 and 2 cm nodules and left lobe 1.5 cm nodules meet criteria for biopsy. Patient had biopsy on 2019 and it was resulted as benign. Plan was to repeat thyroid ultrasound last year, but she did not get it done. She denies any new symptoms. initial history:  Thyroid nodule noted incidentally during imaging: CT chest without contrast on 0086390, which was done to evaluate left-sided chest pain which started after patient had a fall in the bathroom  Difficulty in swallowing (food/pills getting stuck): yes since cervical fusion, have some difficulty in swallowing saliva  Difficulty in breathing: no  Visible swelling in the neck: no  Hoarseness of voice: yes since last year  Family history of thyroid cancer: no  Personal history of radiation exposure to head/neck region: no  family/personal history of other cancers: maternal GF had some type of cancer, uncle had some type of cancer  Symptoms of hypo/hyperthyroidism: tired all the time, hot flashes.     Past Medical History:   Diagnosis Date    Allergy 2020    Arthritis 2020    Essential hypertension 2020    Hypercholesterolemia 2020    Iron deficiency 2020    Mixed hyperlipidemia 2020    Multinodular goiter 2020    Thyroid nodule 2020    Uncontrolled type 2 diabetes mellitus ([address] 2020     Past Surgical History:   Procedure Laterality Date    HX  SECTION        Family History   Problem Relation Age of Onset    Diabetes Mother     Hypertension Father     Diabetes Brother      Social History     Tobacco Use    Smoking status: Never Smoker    Smokeless tobacco: Never Used   Substance Use Topics    Alcohol use: Not Currently      Prior to Admission medications    Medication Sig Start Date End Date Taking? Authorizing Provider   omeprazole (PRILOSEC) 20 mg capsule TAKE TWO ( 2) CAPSULE BY MOUTH ONE TIME DAY 4/19/21  Yes Provider, Historical   meloxicam (MOBIC) 15 mg tablet  6/23/21  Yes Provider, Historical   glipiZIDE (GLUCOTROL) 5 mg tablet Take 1 Tablet by mouth two (2) times a day for 30 days. 7/19/21 8/18/21 Yes Madi Lamb MD   metFORMIN ER (GLUCOPHAGE XR) 500 mg tablet Take 2 tabs twice a day 7/19/21  Yes Madi Lamb MD   ibuprofen (MOTRIN) 600 mg tablet Take 1 Tablet by mouth every six (6) hours as needed for Pain. 5/31/21  Yes Lex Hewitt MD   OneTouch Verio test strips strip USE TO TEST ONCE DAILY AS DIRECTED 4/15/21  Yes Madi Lamb MD   gabapentin (NEURONTIN) 300 mg capsule Take 300 mg by mouth three (3) times daily. Yes Provider, Historical   lidocaine (Lidocaine Viscous) 2 % solution Take 15 mL by mouth as needed for Pain. Yes Provider, Historical   metoprolol succinate (TOPROL-XL) 25 mg XL tablet Take  by mouth daily. Yes Provider, Historical   lancing device/lancets (ONETOUCH DELICA PLUS [employer] DEV) by Does Not Apply route. Yes Provider, Historical   albuterol (ProAir HFA) 90 mcg/actuation inhaler Take  by inhalation. Yes Provider, Historical   simvastatin (ZOCOR) 20 mg tablet Take  by mouth nightly. Yes Provider, Historical   amLODIPine (NORVASC) 5 mg tablet Take 5 mg by mouth daily. Yes Provider, Historical   dicyclomine (BENTYL) 10 mg capsule Take 10 mg by mouth 4 times daily (before meals and nightly). Yes Provider, Historical   peg-electrolyte soln (GaviLyte-N) 420 gram solution Take 4,000 mL by mouth now. Yes Provider, Historical   lactulose (CHRONULAC) 10 gram/15 mL solution Take  by mouth three (3) times daily. Yes Provider, Historical   lisinopril-hydroCHLOROthiazide (PRINZIDE, ZESTORETIC) 20-12.5 mg per tablet Take  by mouth daily.    Yes Provider, Historical   lisinopriL (PRINIVIL, ZESTRIL) 5 mg tablet Take  by mouth daily. Yes Provider, Historical   LORazepam (ATIVAN) 1 mg tablet Take  by mouth every four (4) hours as needed for Anxiety. Yes Provider, Historical   promethazine (PHENERGAN) 6.25 mg/5 mL syrup Take 6.25 mg by mouth four (4) times daily as needed for Nausea. Yes Provider, Historical   traMADoL (ULTRAM) 50 mg tablet Take 50 mg by mouth every six (6) hours as needed for Pain. Yes Provider, Historical        Allergies   Allergen Reactions    Bee Venom Protein (Honey Bee) Swelling       Review of Systems:  ROS    A comprehensive review of systems was preformed and it is negative except mentioned in HPI    Objective:     Vitals:    07/19/21 1142 07/19/21 1152   BP: (!) 150/94 136/83   Pulse: 78 71   Temp: 97.4 °F (36.3 °C)    TempSrc: Temporal    SpO2: 99%    Weight: 203 lb 9.6 oz (92.4 kg)    Height: 5' 6\" (1.676 m)         Physical Exam:    Physical Exam  Vitals and nursing note reviewed. Constitutional:       Appearance: Normal appearance. HENT:      Head: Normocephalic and atraumatic. Cardiovascular:      Rate and Rhythm: Normal rate and regular rhythm. Pulmonary:      Effort: Pulmonary effort is normal.      Breath sounds: Normal breath sounds. Neurological:      Mental Status: She is alert.           Labs and Imaging:    Last 3 Recorded Weights in this Encounter    07/19/21 1142   Weight: 203 lb 9.6 oz (92.4 kg)        No results found for: HBA1C, GSY5QLRW, IAJ9SNNC, FFM1LWEM     Assessment:     Patient Active Problem List   Diagnosis Code    Arthritis M19.90    Essential hypertension I10    Hypercholesterolemia E78.00    Iron deficiency E61.1    Mixed hyperlipidemia E78.2    Multinodular goiter E04.2    Allergy T78.40XA    Thyroid nodule E04.1    Uncontrolled type 2 diabetes mellitus ([address] E11.65    Type 2 diabetes mellitus with hyperglycemia, without long-term current use of insulin ([facility] [address] E11.65           Plan: type II diabetes mellitus uncontrolled  Hemoglobin A1c was explained 6.8% in May 2019, 8.5% on 2-, 8.3% on 4-, 8.1% on 6-8-2021    Fingerstick blood glucose is 147 mg/dL in my office today. Up to date with diabetes related annual labs: yes 6-8--2021 except TSH    Up to date with diabetic eye exam: Yes    Plan:  I had a detailed discussion with patient about importance of controlling diabetes, diabetic diet, decreasing portion size of starches, increasing vegetable intake. Discussed with patient about Judy Powell but she is not willing to try any new medications at this time and she wants to work harder on her eating habits. Discussed with patient about avoiding all sugary beverages, soda, sweet tea. Check blood glucose once a day and bring glucometer to next visit in 3 months. Continue metformin  mg 2 tablets twice a day, glipizide 5 mg twice a day. thyroid nodule  Normal TSH and free T4 on 4542817. Normal calcium in May 2019. CT chest images from 2894113. Bilateral thyroid lobes appear to be enlarged and have multiple hypoechoic nodules. US thyroid:  right lobe 1.8 cm and 2 cm nodule's meet criteria for biopsy because of size. They are isoechoic in appearance. No suspicious features. Left lower 1.5 cm nodule is hypoechoic. This meets criteria for biopsy as well. Left nodule and right nodule (not sure which one) were biopsied on 9- and resulted as benign. Normal TSH on 10-.     Thyroid ultrasound 4- (comparison to August 2019)  Right lobe nodule hyperechoic 2 x 1.8 x 1 cm, TR 3  Previously this was 1.8 x 1.7 x 0.9 cm in size    2.3 x 1.1 x 1.8 cm hyperechoic nodule TR 3  Previously this was 2 x 0.9 x 2 cm in size  Left lobe 1.6 x 1 x 1.1 cm nodule which is hypoechoic, TR 4  Previously this was 1.5 x 1 x 1.3 cm in size    Left anterior 1.9 x 0.6 x 0.8 cm nodule  Previously this was 1.1 x 0.4 x 0.8 cm in size, heterogenous hypoechoic and hyperechoic, TR 34  Plan:  Repeat biopsy of left lobe 1.9 cm nodule because of interval increase in size    mixed hyperlipidemia  currently on simvastatin 20 mg daily. LDL was 99 in May 2019.  6-8-2021: Total cholesterol 166, triglycerides 218, LDL 97. Continue the same for now. I will consider switching this to high intensity statin in future. essential hypertension  blood pressure well controlled on current medications. No microalbuminuria last checked in June 2021. Orders Placed This Encounter    IR FINE NEEDLE ASPIRATION THYROID EACH ADDL     Left lobe 1.9 cm nodule reflex to molecular testing     Standing Status:   Future     Standing Expiration Date:   8/19/2022     Scheduling Instructions:      [address] [place] [address] IR to schedule patient     Order Specific Question:   Transport     Answer:   Ambulatory [1]     Order Specific Question:   Is Patient Pregnant? Answer:   No    AMB POC GLUCOSE BLOOD, BY GLUCOSE MONITORING DEVICE    glipiZIDE (GLUCOTROL) 5 mg tablet     Sig: Take 1 Tablet by mouth two (2) times a day for 30 days.      Dispense:  60 Tablet     Refill:  3    metFORMIN ER (GLUCOPHAGE XR) 500 mg tablet     Sig: Take 2 tabs twice a day     Dispense:  120 Tablet     Refill:  3        Signed By: Dewey Cage MD     July 19, 2021      Return to clinic 3 months

## 2021-07-19 NOTE — LETTER
7/19/2021    Patient: Milly Nguyễn   YOB: 1975   Date of Visit: 7/19/2021     Veverly Lundborg, MD  [address] [place] [id number]  Via Fax: [phone]    Dear Veverly Lundborg, MD,      Thank you for referring Ms. Audra House to [address] [place] [address] ENDOCRINOLOGY for evaluation. My notes for this consultation are attached. If you have questions, please do not hesitate to call me. I look forward to following your patient along with you.       Sincerely,    Kolby Baltazar MD

## 2021-08-09 ENCOUNTER — HOSPITAL ENCOUNTER (OUTPATIENT)
Dept: INTERVENTIONAL RADIOLOGY/VASCULAR | Age: 46
Discharge: HOME | End: 2021-08-09
Attending: INTERNAL MEDICINE
Payer: COMMERCIAL

## 2021-08-09 DIAGNOSIS — E04.2 MULTINODULAR GOITER: ICD-10-CM

## 2021-08-09 PROCEDURE — 10007 FNA BX W/FLUOR GDN 1ST LES: CPT

## 2021-08-09 PROCEDURE — 88108 CYTOPATH CONCENTRATE TECH: CPT

## 2021-08-13 NOTE — TELEPHONE ENCOUNTER
Pt called wanting to know results from biopsy.  Informed pt that Dr. Emeli Martins will be back in office on Monday and once she reviews results someone will reach out to her

## 2021-08-17 ENCOUNTER — TELEPHONE (OUTPATIENT)
Dept: ENDOCRINOLOGY | Age: 46
End: 2021-08-17

## 2021-12-13 DIAGNOSIS — E11.65 TYPE 2 DIABETES MELLITUS WITH HYPERGLYCEMIA, WITHOUT LONG-TERM CURRENT USE OF INSULIN (HCC): ICD-10-CM

## 2021-12-14 RX ORDER — GLIPIZIDE 5 MG/1
TABLET ORAL
Qty: 60 TABLET | Refills: 3 | Status: SHIPPED | OUTPATIENT
Start: 2021-12-14 | End: 2022-04-12

## 2021-12-14 RX ORDER — METFORMIN HYDROCHLORIDE 500 MG/1
TABLET, EXTENDED RELEASE ORAL
Qty: 120 TABLET | Refills: 3 | Status: SHIPPED | OUTPATIENT
Start: 2021-12-14 | End: 2022-04-20

## 2022-03-18 PROBLEM — T78.40XA ALLERGY: Status: ACTIVE | Noted: 2020-09-16

## 2022-03-18 PROBLEM — E78.00 HYPERCHOLESTEROLEMIA: Status: ACTIVE | Noted: 2020-09-16

## 2022-03-18 PROBLEM — I10 ESSENTIAL HYPERTENSION: Status: ACTIVE | Noted: 2020-09-16

## 2022-03-19 PROBLEM — E61.1 IRON DEFICIENCY: Status: ACTIVE | Noted: 2020-09-16

## 2022-03-19 PROBLEM — E04.2 MULTINODULAR GOITER: Status: ACTIVE | Noted: 2020-09-16

## 2022-03-19 PROBLEM — M19.90 ARTHRITIS: Status: ACTIVE | Noted: 2020-09-16

## 2022-03-19 PROBLEM — E04.1 THYROID NODULE: Status: ACTIVE | Noted: 2020-09-16

## 2022-03-19 PROBLEM — E11.65 TYPE 2 DIABETES MELLITUS WITH HYPERGLYCEMIA, WITHOUT LONG-TERM CURRENT USE OF INSULIN (HCC): Status: ACTIVE | Noted: 2021-04-20

## 2022-03-19 PROBLEM — E78.2 MIXED HYPERLIPIDEMIA: Status: ACTIVE | Noted: 2020-09-16

## 2022-04-12 RX ORDER — GLIPIZIDE 5 MG/1
TABLET ORAL
Qty: 60 TABLET | Refills: 3 | Status: SHIPPED | OUTPATIENT
Start: 2022-04-12

## 2022-06-21 DIAGNOSIS — E11.65 TYPE 2 DIABETES MELLITUS WITH HYPERGLYCEMIA, WITHOUT LONG-TERM CURRENT USE OF INSULIN (HCC): ICD-10-CM

## 2022-06-21 RX ORDER — METFORMIN HYDROCHLORIDE 500 MG/1
TABLET, EXTENDED RELEASE ORAL
Qty: 120 TABLET | Refills: 1 | Status: SHIPPED | OUTPATIENT
Start: 2022-06-21

## 2023-12-30 ENCOUNTER — HOSPITAL ENCOUNTER (EMERGENCY)
Facility: HOSPITAL | Age: 48
Discharge: HOME | End: 2023-12-30
Payer: COMMERCIAL

## 2023-12-30 VITALS
BODY MASS INDEX: 32.44 KG/M2 | RESPIRATION RATE: 18 BRPM | SYSTOLIC BLOOD PRESSURE: 157 MMHG | HEIGHT: 64 IN | HEART RATE: 87 BPM | DIASTOLIC BLOOD PRESSURE: 92 MMHG | TEMPERATURE: 98.3 F | OXYGEN SATURATION: 98 % | WEIGHT: 190 LBS

## 2023-12-30 DIAGNOSIS — J06.9 VIRAL UPPER RESPIRATORY TRACT INFECTION: Primary | ICD-10-CM

## 2023-12-30 LAB
FLUAV AG NPH QL IA: NEGATIVE
FLUBV AG NOSE QL IA: NEGATIVE

## 2023-12-30 PROCEDURE — 99283 EMERGENCY DEPT VISIT LOW MDM: CPT

## 2023-12-30 PROCEDURE — 87804 INFLUENZA ASSAY W/OPTIC: CPT

## 2023-12-30 RX ORDER — LORATADINE 10 MG/1
10 TABLET ORAL DAILY
Qty: 30 TABLET | Refills: 0 | Status: SHIPPED | OUTPATIENT
Start: 2023-12-30

## 2023-12-30 RX ORDER — ALBUTEROL SULFATE 90 UG/1
2 AEROSOL, METERED RESPIRATORY (INHALATION) 4 TIMES DAILY PRN
Qty: 18 G | Refills: 0 | Status: SHIPPED | OUTPATIENT
Start: 2023-12-30

## 2023-12-30 RX ORDER — DEXTROMETHORPHAN HYDROBROMIDE AND PROMETHAZINE HYDROCHLORIDE 15; 6.25 MG/5ML; MG/5ML
5 SYRUP ORAL 4 TIMES DAILY PRN
Qty: 118 ML | Refills: 0 | Status: SHIPPED | OUTPATIENT
Start: 2023-12-30 | End: 2024-01-06

## 2023-12-30 ASSESSMENT — PAIN - FUNCTIONAL ASSESSMENT: PAIN_FUNCTIONAL_ASSESSMENT: NONE - DENIES PAIN

## 2023-12-30 ASSESSMENT — LIFESTYLE VARIABLES
HOW OFTEN DO YOU HAVE A DRINK CONTAINING ALCOHOL: NEVER
HOW MANY STANDARD DRINKS CONTAINING ALCOHOL DO YOU HAVE ON A TYPICAL DAY: PATIENT DOES NOT DRINK

## 2023-12-30 NOTE — DISCHARGE INSTRUCTIONS
Thank you! Thank you for allowing me to care for you in the emergency department. It is my goal to provide you with excellent care. If you have not received excellent quality care, please ask to speak to the nurse manager. Please fill out the survey that will come to you by mail or email since we listen to your feedback! Below you will find a list of your tests from today's visit. Should you have any questions, please do not hesitate to call the emergency department. Labs  Recent Results (from the past 12 hour(s))   Rapid influenza A/B antigens    Collection Time: 12/30/23  3:25 PM    Specimen: Nasal Washing   Result Value Ref Range    Influenza A Ag Negative Negative      Influenza B Ag Negative Negative         Radiologic Studies  No orders to display     [unfilled]  [unfilled]  ------------------------------------------------------------------------------------------------------------  The exam and treatment you received in the Emergency Department were for an urgent problem and are not intended as complete care. It is important that you follow-up with a doctor, nurse practitioner, or physician assistant to:  (1) confirm your diagnosis,  (2) re-evaluation of changes in your illness and treatment, and  (3) for ongoing care. Please take your discharge instructions with you when you go to your follow-up appointment. If you have any problem arranging a follow-up appointment, contact the Emergency Department. If your symptoms become worse or you do not improve as expected and you are unable to reach your health care provider, please return to the Emergency Department. We are available 24 hours a day. If a prescription has been provided, please have it filled as soon as possible to prevent a delay in treatment. If you have any questions or reservations about taking the medication due to side effects or interactions with other medications, please call your primary care provider or contact the ER.

## 2023-12-30 NOTE — ED PROVIDER NOTES
[facility] EMERGENCY DEPT  EMERGENCY DEPARTMENT HISTORY AND PHYSICAL EXAM      Date: 2023  Patient Name: Maryuri Storey  MRN: [id number]  [address] [facility] 1975  Date of evaluation: 2023  Provider: Malia Mcdermott PA-C   Note Started: 4:19 PM EST 23    HISTORY OF PRESENT ILLNESS     Chief Complaint   Patient presents with    Fever    Generalized Body Aches    Nasal Congestion       History Provided By: Patient    HPI: Maryuri Storey is a 46 y.o. female with past medical history listed below, presents for fever, chills, nasal congestion, cough, sore throat x 1 day. Patient states that she has had multiple sick contacts in family members have tested positive for both flu and COVID. She tried taking her home medication with minimal relief of symptoms. Denies any shortness of breath, difficulty breathing, nausea/vomiting, constipation/diarrhea, abdominal pain, rash, night sweats, or chest pain. PAST MEDICAL HISTORY   Past Medical History:  Past Medical History:   Diagnosis Date    Allergy 2020    Arthritis 2020    Essential hypertension 2020    Hypercholesterolemia 2020    Iron deficiency 2020    Mixed hyperlipidemia 2020    Multinodular goiter 2020    Thyroid nodule 2020    Uncontrolled type 2 diabetes mellitus 2020       Past Surgical History:  Past Surgical History:   Procedure Laterality Date     SECTION      IR FNA WITH IMAGING  2021       Family History:  Family History   Problem Relation Age of Onset    Hypertension Father     Diabetes Mother     Diabetes Brother        Social History:  Social History     Tobacco Use    Smoking status: Never    Smokeless tobacco: Never   Substance Use Topics    Alcohol use: Not Currently    Drug use: Not Currently       Allergies: Allergies   Allergen Reactions    Bee Venom Swelling       PCP: Sowmya Schofield MD    Current Meds:   No current facility-administered medications for this encounter.

## 2025-02-02 ENCOUNTER — HOSPITAL ENCOUNTER (EMERGENCY)
Facility: HOSPITAL | Age: 50
Discharge: HOME | End: 2025-02-02
Attending: FAMILY MEDICINE

## 2025-02-02 VITALS
RESPIRATION RATE: 16 BRPM | BODY MASS INDEX: 35.36 KG/M2 | TEMPERATURE: 100 F | DIASTOLIC BLOOD PRESSURE: 73 MMHG | WEIGHT: 220 LBS | HEART RATE: 101 BPM | SYSTOLIC BLOOD PRESSURE: 111 MMHG | HEIGHT: 66 IN | OXYGEN SATURATION: 98 %

## 2025-02-02 DIAGNOSIS — R11.2 NAUSEA VOMITING AND DIARRHEA: Primary | ICD-10-CM

## 2025-02-02 DIAGNOSIS — R19.7 NAUSEA VOMITING AND DIARRHEA: Primary | ICD-10-CM

## 2025-02-02 LAB
ALBUMIN SERPL-MCNC: 3 G/DL (ref 3.5–5)
ALBUMIN/GLOB SERPL: 0.6 (ref 1.1–2.2)
ALP SERPL-CCNC: 107 U/L (ref 45–117)
ALT SERPL-CCNC: 43 U/L (ref 12–78)
ANION GAP SERPL CALC-SCNC: 10 MMOL/L (ref 2–12)
AST SERPL W P-5'-P-CCNC: 28 U/L (ref 15–37)
BASOPHILS # BLD: 0.02 K/UL (ref 0–0.1)
BASOPHILS NFR BLD: 0.2 % (ref 0–1)
BILIRUB SERPL-MCNC: 0.8 MG/DL (ref 0.2–1)
BUN SERPL-MCNC: 14 MG/DL (ref 6–20)
BUN/CREAT SERPL: 14 (ref 12–20)
CA-I BLD-MCNC: 8.6 MG/DL (ref 8.5–10.1)
CHLORIDE SERPL-SCNC: 97 MMOL/L (ref 97–108)
CO2 SERPL-SCNC: 27 MMOL/L (ref 21–32)
CREAT SERPL-MCNC: 0.97 MG/DL (ref 0.55–1.02)
DIFFERENTIAL METHOD BLD: ABNORMAL
EOSINOPHIL # BLD: 0.07 K/UL (ref 0–0.4)
EOSINOPHIL NFR BLD: 0.9 % (ref 0–7)
ERYTHROCYTE [DISTWIDTH] IN BLOOD BY AUTOMATED COUNT: 13.6 % (ref 11.5–14.5)
FLUAV RNA SPEC QL NAA+PROBE: NOT DETECTED
FLUBV RNA SPEC QL NAA+PROBE: NOT DETECTED
GLOBULIN SER CALC-MCNC: 4.8 G/DL (ref 2–4)
GLUCOSE SERPL-MCNC: 246 MG/DL (ref 65–100)
HCT VFR BLD AUTO: 39.7 % (ref 35–47)
HGB BLD-MCNC: 13.1 G/DL (ref 11.5–16)
IMM GRANULOCYTES # BLD AUTO: 0.02 K/UL (ref 0–0.04)
IMM GRANULOCYTES NFR BLD AUTO: 0.2 % (ref 0–0.5)
LIPASE SERPL-CCNC: 18 U/L (ref 13–75)
LYMPHOCYTES # BLD: 0.88 K/UL (ref 0.8–3.5)
LYMPHOCYTES NFR BLD: 10.7 % (ref 12–49)
MCH RBC QN AUTO: 27.6 PG (ref 26–34)
MCHC RBC AUTO-ENTMCNC: 33 G/DL (ref 30–36.5)
MCV RBC AUTO: 83.6 FL (ref 80–99)
MONOCYTES # BLD: 0.49 K/UL (ref 0–1)
MONOCYTES NFR BLD: 6 % (ref 5–13)
NEUTS SEG # BLD: 6.75 K/UL (ref 1.8–8)
NEUTS SEG NFR BLD: 82 % (ref 32–75)
PLATELET # BLD AUTO: 354 K/UL (ref 150–400)
PMV BLD AUTO: 9 FL (ref 8.9–12.9)
POTASSIUM SERPL-SCNC: 4 MMOL/L (ref 3.5–5.1)
PROT SERPL-MCNC: 7.8 G/DL (ref 6.4–8.2)
RBC # BLD AUTO: 4.75 M/UL (ref 3.8–5.2)
SARS-COV-2 RNA RESP QL NAA+PROBE: NOT DETECTED
SODIUM SERPL-SCNC: 134 MMOL/L (ref 136–145)
WBC # BLD AUTO: 8.2 K/UL (ref 3.6–11)

## 2025-02-02 PROCEDURE — 80053 COMPREHEN METABOLIC PANEL: CPT

## 2025-02-02 PROCEDURE — 96374 THER/PROPH/DIAG INJ IV PUSH: CPT

## 2025-02-02 PROCEDURE — 96361 HYDRATE IV INFUSION ADD-ON: CPT

## 2025-02-02 PROCEDURE — 6360000002 HC RX W HCPCS: Performed by: FAMILY MEDICINE

## 2025-02-02 PROCEDURE — 87636 SARSCOV2 & INF A&B AMP PRB: CPT

## 2025-02-02 PROCEDURE — 85025 COMPLETE CBC W/AUTO DIFF WBC: CPT

## 2025-02-02 PROCEDURE — 2580000003 HC RX 258: Performed by: FAMILY MEDICINE

## 2025-02-02 PROCEDURE — 83690 ASSAY OF LIPASE: CPT

## 2025-02-02 PROCEDURE — 99284 EMERGENCY DEPT VISIT MOD MDM: CPT

## 2025-02-02 RX ORDER — ONDANSETRON 2 MG/ML
4 INJECTION INTRAMUSCULAR; INTRAVENOUS
Status: COMPLETED | OUTPATIENT
Start: 2025-02-02 | End: 2025-02-02

## 2025-02-02 RX ORDER — ONDANSETRON 4 MG/1
4 TABLET, ORALLY DISINTEGRATING ORAL 3 TIMES DAILY PRN
Qty: 9 TABLET | Refills: 0 | Status: SHIPPED | OUTPATIENT
Start: 2025-02-02 | End: 2025-02-06

## 2025-02-02 RX ORDER — 0.9 % SODIUM CHLORIDE 0.9 %
1000 INTRAVENOUS SOLUTION INTRAVENOUS
Status: COMPLETED | OUTPATIENT
Start: 2025-02-02 | End: 2025-02-02

## 2025-02-02 RX ADMIN — ONDANSETRON 4 MG: 2 INJECTION INTRAMUSCULAR; INTRAVENOUS at 08:26

## 2025-02-02 RX ADMIN — SODIUM CHLORIDE 1000 ML: 9 INJECTION, SOLUTION INTRAVENOUS at 08:27

## 2025-02-02 ASSESSMENT — PAIN - FUNCTIONAL ASSESSMENT: PAIN_FUNCTIONAL_ASSESSMENT: 0-10

## 2025-02-02 ASSESSMENT — PAIN SCALES - GENERAL: PAINLEVEL_OUTOF10: 7

## 2025-02-02 NOTE — DISCHARGE INSTRUCTIONS
Thank you for choosing our Emergency Department for your care.  It is our privilege to care for you in your time of need.  In the next several days, you may receive a survey via email or mailed to your home about your experience with our team.  We would greatly appreciate you taking a few minutes to complete the survey, as we use this information to learn what we have done well and what we could be doing better. Thank you for trusting us with your care!    Below you will find a list of your tests from today's visit.   Labs and Radiology Studies  Recent Results (from the past 12 hour(s))   COVID-19 & Influenza Combo    Collection Time: 02/02/25  8:15 AM    Specimen: Nasopharyngeal   Result Value Ref Range    SARS-CoV-2, PCR Not Detected Not Detected      Rapid Influenza A By PCR Not Detected Not Detected      Rapid Influenza B By PCR Not Detected Not Detected     CBC with Auto Differential    Collection Time: 02/02/25  8:15 AM   Result Value Ref Range    WBC 8.2 3.6 - 11.0 K/uL    RBC 4.75 3.80 - 5.20 M/uL    Hemoglobin 13.1 11.5 - 16.0 g/dL    Hematocrit 39.7 35.0 - 47.0 %    MCV 83.6 80.0 - 99.0 FL    MCH 27.6 26.0 - 34.0 PG    MCHC 33.0 30.0 - 36.5 g/dL    RDW 13.6 11.5 - 14.5 %    Platelets 354 150 - 400 K/uL    MPV 9.0 8.9 - 12.9 FL    Neutrophils % 82.0 (H) 32.0 - 75.0 %    Lymphocytes % 10.7 (L) 12.0 - 49.0 %    Monocytes % 6.0 5.0 - 13.0 %    Eosinophils % 0.9 0.0 - 7.0 %    Basophils % 0.2 0.0 - 1.0 %    Immature Granulocytes % 0.2 0.0 - 0.5 %    Neutrophils Absolute 6.75 1.80 - 8.00 K/UL    Lymphocytes Absolute 0.88 0.80 - 3.50 K/UL    Monocytes Absolute 0.49 0.00 - 1.00 K/UL    Eosinophils Absolute 0.07 0.00 - 0.40 K/UL    Basophils Absolute 0.02 0.00 - 0.10 K/UL    Immature Granulocytes Absolute 0.02 0.00 - 0.04 K/UL    Differential Type AUTOMATED     Comprehensive Metabolic Panel    Collection Time: 02/02/25  8:15 AM   Result Value Ref Range    Sodium 134 (L) 136 - 145 mmol/L    Potassium 4.0 3.5 -

## 2025-02-02 NOTE — ED PROVIDER NOTES
EMERGENCY DEPARTMENT HISTORY AND PHYSICAL EXAM      Date: 2/2/2025  Patient Name: Shanice Lindquist    History of Presenting Illness     Chief Complaint   Patient presents with    Vomiting    Diarrhea    Fever       History Provided By:     HPI: Shanice Lindquist, is a very pleasant 49 y.o. female presenting to the ED with a chief complaint of nausea vomiting diarrhea.  2 sick contacts with identical symptoms.  Also having body.  Denies abdominal pain.  No urinary symptoms.  Has not been able to keep anything down for the last day.      Denies any other symptoms at this time.    PCP: Burke Thomas MD    No current facility-administered medications on file prior to encounter.     Current Outpatient Medications on File Prior to Encounter   Medication Sig Dispense Refill    loratadine (CLARITIN) 10 MG tablet Take 1 tablet by mouth daily 30 tablet 0    albuterol sulfate HFA (VENTOLIN HFA) 108 (90 Base) MCG/ACT inhaler Inhale 2 puffs into the lungs 4 times daily as needed for Wheezing 18 g 0    albuterol sulfate HFA (PROVENTIL;VENTOLIN;PROAIR) 108 (90 Base) MCG/ACT inhaler Inhale into the lungs      amLODIPine (NORVASC) 5 MG tablet Take 5 mg by mouth daily      dicyclomine (BENTYL) 10 MG capsule Take 10 mg by mouth 4 times daily (before meals and nightly)      gabapentin (NEURONTIN) 300 MG capsule Take 300 mg by mouth 3 times daily.      glipiZIDE (GLUCOTROL) 5 MG tablet TAKE ONE TABLET BY MOUTH TWICE A DAY      ibuprofen (ADVIL;MOTRIN) 600 MG tablet Take 600 mg by mouth every 6 hours as needed      lactulose (CHRONULAC) 10 GM/15ML solution Take by mouth 3 times daily      lidocaine viscous hcl (XYLOCAINE) 2 % SOLN solution Take 15 mLs by mouth as needed      lisinopril (PRINIVIL;ZESTRIL) 5 MG tablet Take by mouth daily (Patient not taking: Reported on 2/2/2025)      lisinopril-hydroCHLOROthiazide (PRINZIDE;ZESTORETIC) 20-12.5 MG per tablet Take by mouth daily (Patient not taking: Reported on 2/2/2025)      
stable